# Patient Record
Sex: FEMALE | Race: BLACK OR AFRICAN AMERICAN | NOT HISPANIC OR LATINO | Employment: PART TIME | URBAN - METROPOLITAN AREA
[De-identification: names, ages, dates, MRNs, and addresses within clinical notes are randomized per-mention and may not be internally consistent; named-entity substitution may affect disease eponyms.]

---

## 2017-06-19 ENCOUNTER — OFFICE VISIT (OUTPATIENT)
Dept: OBSTETRICS AND GYNECOLOGY | Facility: CLINIC | Age: 42
End: 2017-06-19

## 2017-06-19 VITALS
SYSTOLIC BLOOD PRESSURE: 110 MMHG | HEART RATE: 70 BPM | DIASTOLIC BLOOD PRESSURE: 72 MMHG | HEIGHT: 68 IN | BODY MASS INDEX: 22.43 KG/M2 | WEIGHT: 148 LBS

## 2017-06-19 DIAGNOSIS — Z20.2 EXPOSURE TO STD: ICD-10-CM

## 2017-06-19 DIAGNOSIS — N89.8 VAGINAL DISCHARGE: Primary | ICD-10-CM

## 2017-06-19 PROCEDURE — 99213 OFFICE O/P EST LOW 20 MIN: CPT | Performed by: OBSTETRICS & GYNECOLOGY

## 2017-06-19 RX ORDER — LISINOPRIL 5 MG/1
TABLET ORAL
Refills: 6 | COMMUNITY
Start: 2017-06-06 | End: 2017-12-06

## 2017-06-19 RX ORDER — AMLODIPINE BESYLATE 5 MG/1
TABLET ORAL
Refills: 2 | COMMUNITY
Start: 2017-06-04 | End: 2021-01-06

## 2017-06-19 RX ORDER — CYCLOBENZAPRINE HCL 5 MG
TABLET ORAL
Refills: 0 | COMMUNITY
Start: 2017-05-24 | End: 2017-12-06

## 2017-06-19 NOTE — PROGRESS NOTES
"Chief Complaint   Patient presents with   • Vaginal Discharge       History of Present Illness    Patient is a 42 y.o. female complains of constant vaginal discharge for the past few months. Patient was diagnosed with trichomonas at outside facility several months ago and does not believe her  got treated. She feels like her symptoms have not improved since treatment with flagyl. Patient would like to be screened for all STDs.    The following portions of the patient's history were reviewed and updated as appropriate: allergies, current medications, past family history, past medical history, past social history, past surgical history and problem list.    Review of Systems   Genitourinary: Positive for vaginal discharge. Negative for menstrual problem.   All other systems reviewed and are negative.      Vitals:    06/19/17 1146   BP: 110/72   Pulse: 70   Weight: 148 lb (67.1 kg)   Height: 67.5\" (171.5 cm)       Objective   Physical Exam   Constitutional: She appears well-developed and well-nourished.   HENT:   Head: Normocephalic and atraumatic.   Abdominal: Soft. She exhibits no distension and no mass. There is no tenderness. There is no rebound and no guarding. No hernia.   Genitourinary: Rectum normal and uterus normal. Rectal exam shows no external hemorrhoid. There is no lesion on the right labia. There is no lesion on the left labia. Uterus is not tender. Cervix exhibits no discharge. Right adnexum displays no mass and no tenderness. Left adnexum displays no mass and no tenderness. Vaginal discharge found.   Musculoskeletal: Normal range of motion.   Lymphadenopathy:        Right: No inguinal adenopathy present.        Left: No inguinal adenopathy present.   Neurological: She is alert.   Skin: Skin is warm.   Psychiatric: She has a normal mood and affect.         Assessment/Plan   Juan C was seen today for vaginal discharge.    Diagnoses and all orders for this visit:    Vaginal discharge  -     NuSwab " VG+    Exposure to STD  -     Hepatitis B Surface Antigen  -     Hepatitis C Antibody  -     HIV-1 / O / 2 Ag / Antibody 4th Generation  -     RPR, Rfx Qn RPR / Confirm TP    will contact patient with results. Preventative measures discussed including condom use.    10/15 minutes spent in face to face patient consultation.         Return if symptoms worsen or fail to improve.

## 2017-06-20 LAB
HBV SURFACE AG SERPL QL IA: NEGATIVE
HCV AB S/CO SERPL IA: <0.1 S/CO RATIO (ref 0–0.9)
HIV 1+2 AB+HIV1 P24 AG SERPL QL IA: NON REACTIVE
RPR SER QL: NON REACTIVE

## 2017-06-22 LAB
A VAGINAE DNA VAG QL NAA+PROBE: ABNORMAL SCORE
BVAB2 DNA VAG QL NAA+PROBE: ABNORMAL SCORE
C ALBICANS DNA VAG QL NAA+PROBE: NEGATIVE
C GLABRATA DNA VAG QL NAA+PROBE: NEGATIVE
C TRACH RRNA SPEC QL NAA+PROBE: NEGATIVE
MEGA1 DNA VAG QL NAA+PROBE: ABNORMAL SCORE
N GONORRHOEA RRNA SPEC QL NAA+PROBE: NEGATIVE
T VAGINALIS RRNA SPEC QL NAA+PROBE: POSITIVE

## 2017-06-22 RX ORDER — METRONIDAZOLE 500 MG/1
TABLET ORAL
Qty: 4 TABLET | Refills: 0 | Status: SHIPPED | OUTPATIENT
Start: 2017-06-22 | End: 2017-06-27

## 2017-06-27 ENCOUNTER — TELEPHONE (OUTPATIENT)
Dept: OBSTETRICS AND GYNECOLOGY | Facility: CLINIC | Age: 42
End: 2017-06-27

## 2017-06-27 RX ORDER — METRONIDAZOLE 7.5 MG/G
GEL VAGINAL NIGHTLY
Qty: 1 TUBE | Refills: 0 | Status: SHIPPED | OUTPATIENT
Start: 2017-06-27 | End: 2017-12-06

## 2017-06-27 NOTE — TELEPHONE ENCOUNTER
----- Message from Gayle Logan sent at 6/27/2017  9:46 AM EDT -----  Contact: pt  Patient called stating she took all of the Flagyl at once. Then she vomited 2 of them in the toilet. She is asking for another RX. Patient said she is still having the vaginal irritation. Please advise. Pt # is [phone]    Pharmacy is in chart    I sent metrogel - the cream instead.

## 2017-12-06 ENCOUNTER — OFFICE VISIT (OUTPATIENT)
Dept: OBSTETRICS AND GYNECOLOGY | Facility: CLINIC | Age: 42
End: 2017-12-06

## 2017-12-06 VITALS
HEIGHT: 68 IN | WEIGHT: 154 LBS | DIASTOLIC BLOOD PRESSURE: 88 MMHG | BODY MASS INDEX: 23.34 KG/M2 | SYSTOLIC BLOOD PRESSURE: 128 MMHG

## 2017-12-06 DIAGNOSIS — N89.8 VAGINAL LESION: Primary | ICD-10-CM

## 2017-12-06 DIAGNOSIS — N89.8 VAGINAL DISCHARGE: ICD-10-CM

## 2017-12-06 PROCEDURE — 99213 OFFICE O/P EST LOW 20 MIN: CPT | Performed by: OBSTETRICS & GYNECOLOGY

## 2017-12-06 RX ORDER — VALACYCLOVIR HYDROCHLORIDE 500 MG/1
TABLET, FILM COATED ORAL
Qty: 28 TABLET | Refills: 0 | Status: SHIPPED | OUTPATIENT
Start: 2017-12-06 | End: 2021-01-06

## 2017-12-06 NOTE — PROGRESS NOTES
Subjective   Juan C Gordon is a 42 y.o. female   Chief Complaint   Patient presents with   • Herpes Zoster     patient states the medication she is currently taking for herpes is not working for her.        History of Present Illness   Reports that she has never been diagnosed with HSV on lab exam  Has had previous exposure so she was on medication when she had prodromal symptoms.    Recently, felt symptoms and then a bump came up 3-4 weeks ago and the medication is not working after starting it two weeks ago.    The bump is irritating and feels hot.    No other rashes or lesions.   No fever or chills.  No nausea/vomiting    Also had trichomonas for which she and her partner were treated but she feels like she is still having some non odorous discharge and would like to be retested  Past Medical History:   Diagnosis Date   • Herpes genitalia    • IBS (irritable bowel syndrome)    • Ovarian cyst      Past Surgical History:   Procedure Laterality Date   • DILATION AND CURETTAGE, DIAGNOSTIC / THERAPEUTIC     • LEEP     • OVARIAN CYST REMOVAL       Social History   Substance Use Topics   • Smoking status: Never Smoker   • Smokeless tobacco: Never Used   • Alcohol use No     Family History   Problem Relation Age of Onset   • Hypertension Mother    • Hypertension Father    • Brain cancer Father        Review of Systems   Constitutional: Negative for activity change, appetite change, fatigue, fever and unexpected weight change.   Gastrointestinal: Negative for abdominal pain, nausea and vomiting.   Genitourinary: Positive for genital sores and vaginal discharge. Negative for menstrual problem, vaginal bleeding and vaginal pain.   Hematological: Does not bruise/bleed easily.   Psychiatric/Behavioral: Negative for agitation.       Objective   Physical Exam   Constitutional: She is oriented to person, place, and time. She appears well-developed and well-nourished. No distress.   HENT:   Head: Normocephalic and atraumatic.    Neck: No tracheal deviation present. No thyromegaly present.   Cardiovascular: Normal rate.    Pulmonary/Chest: Effort normal. Right breast exhibits no inverted nipple, no mass, no nipple discharge, no skin change and no tenderness. Left breast exhibits no inverted nipple, no mass, no nipple discharge, no skin change and no tenderness.   Abdominal: Soft. She exhibits no distension and no mass. There is no tenderness.   Genitourinary: Uterus normal.       No labial fusion. There is no rash, lesion or injury on the right labia. There is no rash, lesion or injury on the left labia. Uterus is not deviated, not enlarged, not fixed and not tender. Cervix exhibits no motion tenderness, no discharge and no friability. Right adnexum displays no mass, no tenderness and no fullness. Left adnexum displays no mass, no tenderness and no fullness. No tenderness or bleeding in the vagina. No vaginal discharge found.   Musculoskeletal: Normal range of motion. She exhibits no edema or deformity.   Lymphadenopathy:     She has no cervical adenopathy.   Neurological: She is alert and oriented to person, place, and time.   Skin: Skin is warm and dry. No rash noted. She is not diaphoretic.   Psychiatric: She has a normal mood and affect. Her behavior is normal. Judgment and thought content normal.         Assessment/Plan   Juan C was seen today for herpes zoster.    Diagnoses and all orders for this visit:    Vaginal lesion  -     Herpes Simplex Virus (HSV) 1 & 2, ISSA - ThinPrep Vial, Cervix    Vaginal discharge  -     Chlamydia trachomatis, Neisseria gonorrhoeae, Trichomonas vaginalis, PCR - Swab, Vagina    Other orders  -     valACYclovir (VALTREX) 500 MG tablet; Take 1g PO BID x 7 days      Discussed we can increase valtrex to primary outbreak dosing to see if resolves  Infection/pain precautions reviewed.    Will call with results

## 2017-12-08 LAB
C TRACH RRNA SPEC QL NAA+PROBE: NEGATIVE
N GONORRHOEA RRNA SPEC QL NAA+PROBE: NEGATIVE
T VAGINALIS RRNA SPEC QL NAA+PROBE: NEGATIVE

## 2017-12-09 LAB
HSV1 DNA SPEC QL NAA+PROBE: NEGATIVE
HSV2 DNA SPEC QL NAA+PROBE: NEGATIVE

## 2017-12-11 ENCOUNTER — TELEPHONE (OUTPATIENT)
Dept: OBSTETRICS AND GYNECOLOGY | Facility: CLINIC | Age: 42
End: 2017-12-11

## 2017-12-11 NOTE — TELEPHONE ENCOUNTER
----- Message from Danni Grossman MD sent at 12/10/2017  6:41 PM EST -----  Please notify patient of negative HSV (herpes) and Chlamydia/gonorrhea/trichomonas testing.  If lesion is still present, recommend follow up in 2 weeks

## 2018-09-04 ENCOUNTER — OFFICE VISIT (OUTPATIENT)
Dept: OBSTETRICS AND GYNECOLOGY | Facility: CLINIC | Age: 43
End: 2018-09-04

## 2018-09-04 VITALS
HEART RATE: 87 BPM | DIASTOLIC BLOOD PRESSURE: 74 MMHG | HEIGHT: 68 IN | WEIGHT: 150 LBS | SYSTOLIC BLOOD PRESSURE: 113 MMHG | BODY MASS INDEX: 22.73 KG/M2

## 2018-09-04 DIAGNOSIS — N76.1 SUBACUTE VAGINITIS: Primary | ICD-10-CM

## 2018-09-04 PROCEDURE — 99213 OFFICE O/P EST LOW 20 MIN: CPT | Performed by: OBSTETRICS & GYNECOLOGY

## 2018-09-04 RX ORDER — CYCLOBENZAPRINE HCL 5 MG
5 TABLET ORAL
COMMUNITY
Start: 2018-03-23 | End: 2019-03-23

## 2018-09-04 RX ORDER — ERGOCALCIFEROL 1.25 MG/1
CAPSULE ORAL
Refills: 1 | COMMUNITY
Start: 2018-06-12 | End: 2021-01-06

## 2018-09-04 RX ORDER — IBUPROFEN 600 MG/1
TABLET ORAL
COMMUNITY
End: 2021-01-06

## 2018-09-04 RX ORDER — AMLODIPINE BESYLATE 5 MG/1
5 TABLET ORAL
COMMUNITY
Start: 2018-04-25 | End: 2021-01-06

## 2018-09-04 NOTE — PROGRESS NOTES
"Sawyer Gordon is a 43 y.o. female   Chief Complaint   Patient presents with   • Vaginal Discharge     patient reports clear whitish discharge. Patient also reports vaginal odor one time. Patient denies itching or burning.       History of Present Illness  Patient reports a month of vaginal discharge that has been coming and going.  Sometimes clear, sometimes light.  1 and it was thick.  Once it was associated with an unpleasant odor that was not fishy but didn't smell \"fresh.\"  Denies any fevers or chills denies new sexual partners.  Past Medical History:   Diagnosis Date   • Herpes genitalia    • IBS (irritable bowel syndrome)    • Ovarian cyst      Past Surgical History:   Procedure Laterality Date   • DILATION AND CURETTAGE, DIAGNOSTIC / THERAPEUTIC     • LEEP     • OVARIAN CYST REMOVAL       Social History   Substance Use Topics   • Smoking status: Never Smoker   • Smokeless tobacco: Never Used   • Alcohol use No     Family History   Problem Relation Age of Onset   • Hypertension Mother    • Hypertension Father    • Brain cancer Father      Review of Systems   Constitutional: Negative for activity change, appetite change, fatigue, fever and unexpected weight change.   Gastrointestinal: Negative for abdominal pain, nausea and vomiting.   Genitourinary: Positive for vaginal discharge. Negative for menstrual problem, vaginal bleeding and vaginal pain.   Hematological: Does not bruise/bleed easily.   Psychiatric/Behavioral: Negative for agitation.       Objective    /74   Pulse 87   Ht 171.5 cm (67.52\")   Wt 68 kg (150 lb)   LMP 08/11/2018 (Within Days)   BMI 23.13 kg/m²    Physical Exam   Constitutional: She is oriented to person, place, and time. She appears well-developed and well-nourished.   HENT:   Head: Normocephalic and atraumatic.   Eyes: EOM are normal. No scleral icterus.   Neck: Normal range of motion.   Cardiovascular: Normal rate.    Pulmonary/Chest: Effort normal. No respiratory " distress.   Abdominal: Soft.   Genitourinary: Uterus normal. Cervix exhibits no motion tenderness, no discharge and no friability. Right adnexum displays no mass, no tenderness and no fullness. Left adnexum displays no mass, no tenderness and no fullness. No tenderness in the vagina. Vaginal discharge (scant, thin) found.   Genitourinary Comments: Erythema of labia minora and vaginal mucosa   Neurological: She is alert and oriented to person, place, and time.   Skin: Skin is warm and dry.   Psychiatric: She has a normal mood and affect. Her behavior is normal.     Assessment/Plan   Problems Addressed this Visit     None      Visit Diagnoses     Subacute vaginitis    -  Primary    Relevant Orders    NuSwab VG+ - Swab, Vagina      Treat based on results  Reviewed avoidance of irritants, douching, scented soaps/feminine washes.    Return in about 4 months (around 12/31/2018) for Annual physical.

## 2018-09-07 ENCOUNTER — TELEPHONE (OUTPATIENT)
Dept: OBSTETRICS AND GYNECOLOGY | Facility: CLINIC | Age: 43
End: 2018-09-07

## 2018-09-07 LAB
A VAGINAE DNA VAG QL NAA+PROBE: NORMAL SCORE
BVAB2 DNA VAG QL NAA+PROBE: NORMAL SCORE
C ALBICANS DNA VAG QL NAA+PROBE: NEGATIVE
C GLABRATA DNA VAG QL NAA+PROBE: NEGATIVE
C TRACH RRNA SPEC QL NAA+PROBE: NEGATIVE
MEGA1 DNA VAG QL NAA+PROBE: NORMAL SCORE
N GONORRHOEA RRNA SPEC QL NAA+PROBE: NEGATIVE
T VAGINALIS RRNA SPEC QL NAA+PROBE: NEGATIVE

## 2018-09-07 NOTE — TELEPHONE ENCOUNTER
09/07/18  Pt informed of results.    ----- Message from Danni Grossman MD sent at 9/7/2018  1:14 PM EDT -----  Please inform patient of negative gonorrhea/chlamydia/trichomonas/BV/yeast testing

## 2019-01-14 ENCOUNTER — APPOINTMENT (OUTPATIENT)
Dept: GENERAL RADIOLOGY | Facility: HOSPITAL | Age: 44
End: 2019-01-14

## 2019-01-14 ENCOUNTER — HOSPITAL ENCOUNTER (EMERGENCY)
Facility: HOSPITAL | Age: 44
Discharge: HOME | End: 2019-01-14
Attending: EMERGENCY MEDICINE | Admitting: EMERGENCY MEDICINE

## 2019-01-14 VITALS
BODY MASS INDEX: 21.66 KG/M2 | HEIGHT: 67 IN | WEIGHT: 138 LBS | OXYGEN SATURATION: 100 % | RESPIRATION RATE: 16 BRPM | TEMPERATURE: 98.5 F | HEART RATE: 71 BPM | SYSTOLIC BLOOD PRESSURE: 150 MMHG | DIASTOLIC BLOOD PRESSURE: 100 MMHG

## 2019-01-14 DIAGNOSIS — R07.9 CHEST PAIN AT REST: Primary | ICD-10-CM

## 2019-01-14 LAB
ALBUMIN SERPL-MCNC: 4.1 G/DL (ref 3.5–5.2)
ALBUMIN/GLOB SERPL: 1.2 G/DL
ALP SERPL-CCNC: 63 U/L (ref 39–117)
ALT SERPL W P-5'-P-CCNC: 17 U/L (ref 1–33)
ANION GAP SERPL CALCULATED.3IONS-SCNC: 10.6 MMOL/L
AST SERPL-CCNC: 15 U/L (ref 1–32)
BASOPHILS # BLD AUTO: 0.03 10*3/MM3 (ref 0–0.2)
BASOPHILS NFR BLD AUTO: 0.6 % (ref 0–1.5)
BILIRUB SERPL-MCNC: 0.2 MG/DL (ref 0.1–1.2)
BUN BLD-MCNC: 11 MG/DL (ref 6–20)
BUN/CREAT SERPL: 13.8 (ref 7–25)
CALCIUM SPEC-SCNC: 8.7 MG/DL (ref 8.6–10.5)
CHLORIDE SERPL-SCNC: 103 MMOL/L (ref 98–107)
CO2 SERPL-SCNC: 25.4 MMOL/L (ref 22–29)
CREAT BLD-MCNC: 0.8 MG/DL (ref 0.57–1)
DEPRECATED RDW RBC AUTO: 42.1 FL (ref 37–54)
EOSINOPHIL # BLD AUTO: 0.04 10*3/MM3 (ref 0–0.7)
EOSINOPHIL NFR BLD AUTO: 0.9 % (ref 0.3–6.2)
ERYTHROCYTE [DISTWIDTH] IN BLOOD BY AUTOMATED COUNT: 13.3 % (ref 11.7–13)
GFR SERPL CREATININE-BSD FRML MDRD: 94 ML/MIN/1.73
GLOBULIN UR ELPH-MCNC: 3.3 GM/DL
GLUCOSE BLD-MCNC: 91 MG/DL (ref 65–99)
HCT VFR BLD AUTO: 38.3 % (ref 35.6–45.5)
HGB BLD-MCNC: 13.1 G/DL (ref 11.9–15.5)
IMM GRANULOCYTES # BLD AUTO: 0 10*3/MM3 (ref 0–0.03)
IMM GRANULOCYTES NFR BLD AUTO: 0 % (ref 0–0.5)
LYMPHOCYTES # BLD AUTO: 1.41 10*3/MM3 (ref 0.9–4.8)
LYMPHOCYTES NFR BLD AUTO: 30.4 % (ref 19.6–45.3)
MCH RBC QN AUTO: 29.4 PG (ref 26.9–32)
MCHC RBC AUTO-ENTMCNC: 34.2 G/DL (ref 32.4–36.3)
MCV RBC AUTO: 85.9 FL (ref 80.5–98.2)
MONOCYTES # BLD AUTO: 0.17 10*3/MM3 (ref 0.2–1.2)
MONOCYTES NFR BLD AUTO: 3.7 % (ref 5–12)
NEUTROPHILS # BLD AUTO: 2.99 10*3/MM3 (ref 1.9–8.1)
NEUTROPHILS NFR BLD AUTO: 64.4 % (ref 42.7–76)
PLATELET # BLD AUTO: 256 10*3/MM3 (ref 140–500)
PMV BLD AUTO: 9.7 FL (ref 6–12)
POTASSIUM BLD-SCNC: 3.6 MMOL/L (ref 3.5–5.2)
PROT SERPL-MCNC: 7.4 G/DL (ref 6–8.5)
RBC # BLD AUTO: 4.46 10*6/MM3 (ref 3.9–5.2)
SODIUM BLD-SCNC: 139 MMOL/L (ref 136–145)
TROPONIN T SERPL-MCNC: <0.01 NG/ML (ref 0–0.03)
TROPONIN T SERPL-MCNC: <0.01 NG/ML (ref 0–0.03)
WBC NRBC COR # BLD: 4.64 10*3/MM3 (ref 4.5–10.7)

## 2019-01-14 PROCEDURE — 80053 COMPREHEN METABOLIC PANEL: CPT | Performed by: EMERGENCY MEDICINE

## 2019-01-14 PROCEDURE — 96374 THER/PROPH/DIAG INJ IV PUSH: CPT

## 2019-01-14 PROCEDURE — 84484 ASSAY OF TROPONIN QUANT: CPT | Performed by: EMERGENCY MEDICINE

## 2019-01-14 PROCEDURE — 71045 X-RAY EXAM CHEST 1 VIEW: CPT

## 2019-01-14 PROCEDURE — 99283 EMERGENCY DEPT VISIT LOW MDM: CPT

## 2019-01-14 PROCEDURE — 93010 ELECTROCARDIOGRAM REPORT: CPT | Performed by: INTERNAL MEDICINE

## 2019-01-14 PROCEDURE — 93005 ELECTROCARDIOGRAM TRACING: CPT | Performed by: EMERGENCY MEDICINE

## 2019-01-14 PROCEDURE — 93005 ELECTROCARDIOGRAM TRACING: CPT

## 2019-01-14 PROCEDURE — 25010000002 KETOROLAC TROMETHAMINE PER 15 MG: Performed by: EMERGENCY MEDICINE

## 2019-01-14 PROCEDURE — 85025 COMPLETE CBC W/AUTO DIFF WBC: CPT | Performed by: EMERGENCY MEDICINE

## 2019-01-14 RX ORDER — SODIUM CHLORIDE 0.9 % (FLUSH) 0.9 %
10 SYRINGE (ML) INJECTION AS NEEDED
Status: DISCONTINUED | OUTPATIENT
Start: 2019-01-14 | End: 2019-01-15 | Stop reason: HOSPADM

## 2019-01-14 RX ORDER — KETOROLAC TROMETHAMINE 30 MG/ML
30 INJECTION, SOLUTION INTRAMUSCULAR; INTRAVENOUS ONCE
Status: COMPLETED | OUTPATIENT
Start: 2019-01-14 | End: 2019-01-14

## 2019-01-14 RX ADMIN — KETOROLAC TROMETHAMINE 30 MG: 30 INJECTION, SOLUTION INTRAMUSCULAR at 21:31

## 2019-01-15 NOTE — ED PROVIDER NOTES
EMERGENCY DEPARTMENT ENCOUNTER    CHIEF COMPLAINT  Chief Complaint: Chest Pain  History given by: Pt  History limited by: none  Room Number: 15/15  PMD: Forrest Giron MD      HPI:  Pt is a 43 y.o. female who presents complaining of mid-sternal chest pain that began 6 hours ago. Pt states pain is worse with movement. Pt states the pain began while walking to kitchen from her office. Pt states she then went to bathroom and, as she was getting up, her pain worsened and she began to feel nauseated. Pt also c/o neck pain (states she has cysts on her thyroid). Pt denies vomiting.    Duration:  6 hours ago  Onset: gradual  Timing: constant  Location: chest  Radiation: none  Quality: pain  Intensity/Severity: moderate  Progression: unchanged  Associated Symptoms: neck pain, nausea   Aggravating Factors: movement  Alleviating Factors: rest  Previous Episodes: none  Treatment before arrival: none    PAST MEDICAL HISTORY  Active Ambulatory Problems     Diagnosis Date Noted   • Encounter for gynecological examination with abnormal finding 12/12/2016   • Vaginal discharge 06/19/2017   • Exposure to STD 06/19/2017     Resolved Ambulatory Problems     Diagnosis Date Noted   • No Resolved Ambulatory Problems     Past Medical History:   Diagnosis Date   • Herpes genitalia    • IBS (irritable bowel syndrome)    • Ovarian cyst        PAST SURGICAL HISTORY  Past Surgical History:   Procedure Laterality Date   • DILATION AND CURETTAGE, DIAGNOSTIC / THERAPEUTIC     • LEEP     • OVARIAN CYST REMOVAL         FAMILY HISTORY  Family History   Problem Relation Age of Onset   • Hypertension Mother    • Hypertension Father    • Brain cancer Father        SOCIAL HISTORY  Social History     Socioeconomic History   • Marital status: Legally      Spouse name: Not on file   • Number of children: Not on file   • Years of education: Not on file   • Highest education level: Not on file   Social Needs   • Financial resource strain: Not on file    • Food insecurity - worry: Not on file   • Food insecurity - inability: Not on file   • Transportation needs - medical: Not on file   • Transportation needs - non-medical: Not on file   Occupational History   • Not on file   Tobacco Use   • Smoking status: Never Smoker   • Smokeless tobacco: Never Used   Substance and Sexual Activity   • Alcohol use: No   • Drug use: No   • Sexual activity: Yes     Partners: Male     Birth control/protection: Other     Comment: vasectomy   Other Topics Concern   • Not on file   Social History Narrative   • Not on file       ALLERGIES  Patient has no known allergies.    REVIEW OF SYSTEMS  Review of Systems   Constitutional: Negative for fever.   HENT: Negative for sore throat.    Eyes: Negative.    Respiratory: Negative for cough and shortness of breath.    Cardiovascular: Positive for chest pain (began 6 hours PTA).   Gastrointestinal: Positive for nausea. Negative for abdominal pain, diarrhea and vomiting.   Genitourinary: Negative for dysuria.   Musculoskeletal: Positive for neck pain.   Skin: Negative for rash.   Allergic/Immunologic: Negative.    Neurological: Negative for weakness, numbness and headaches.   Hematological: Negative.    Psychiatric/Behavioral: Negative.    All other systems reviewed and are negative.      PHYSICAL EXAM  ED Triage Vitals   Temp Heart Rate Resp BP SpO2   01/14/19 1934 01/14/19 1934 01/14/19 1934 01/14/19 2015 01/14/19 1934   98.5 °F (36.9 °C) 80 16 158/98 99 %      Temp src Heart Rate Source Patient Position BP Location FiO2 (%)   01/14/19 1934 01/14/19 1934 -- -- --   Tympanic Monitor          Physical Exam   Constitutional: She is oriented to person, place, and time. No distress.   HENT:   Head: Normocephalic and atraumatic.   Eyes: EOM are normal. Pupils are equal, round, and reactive to light.   Neck: Normal range of motion. Neck supple.   Cardiovascular: Normal rate, regular rhythm and normal heart sounds.   Pulmonary/Chest: Effort normal  and breath sounds normal. No respiratory distress.   Pain reproducible with movement   Abdominal: Soft. There is no tenderness. There is no rebound and no guarding.   Musculoskeletal: Normal range of motion. She exhibits no edema.   Neurological: She is alert and oriented to person, place, and time. She has normal sensation and normal strength.   Skin: Skin is warm and dry. No rash noted.   Psychiatric: Mood and affect normal.   Nursing note and vitals reviewed.      LAB RESULTS  Lab Results (last 24 hours)     Procedure Component Value Units Date/Time    CBC & Differential [[id number]] Collected:  01/14/19 2015    Specimen:  Blood Updated:  01/14/19 2030    Narrative:       The following orders were created for panel order CBC & Differential.  Procedure                               Abnormality         Status                     ---------                               -----------         ------                     CBC Auto Differential[[id number]]        Abnormal            Final result                 Please view results for these tests on the individual orders.    Comprehensive Metabolic Panel [[id number]] Collected:  01/14/19 2015    Specimen:  Blood Updated:  01/14/19 2050     Glucose 91 mg/dL      BUN 11 mg/dL      Creatinine 0.80 mg/dL      Sodium 139 mmol/L      Potassium 3.6 mmol/L      Chloride 103 mmol/L      CO2 25.4 mmol/L      Calcium 8.7 mg/dL      Total Protein 7.4 g/dL      Albumin 4.10 g/dL      ALT (SGPT) 17 U/L      AST (SGOT) 15 U/L      Alkaline Phosphatase 63 U/L      Total Bilirubin 0.2 mg/dL      eGFR  African Amer 94 mL/min/1.73      Globulin 3.3 gm/dL      A/G Ratio 1.2 g/dL      BUN/Creatinine Ratio 13.8     Anion Gap 10.6 mmol/L     Troponin [[id number]]  (Normal) Collected:  01/14/19 2015    Specimen:  Blood Updated:  01/14/19 2050     Troponin T <0.010 ng/mL     Narrative:       Troponin T Reference Ranges:  Less than 0.03 ng/mL:    Negative for AMI  0.03 to 0.09 ng/mL:      Indeterminant  for AMI  Greater than 0.09 ng/mL: Positive for AMI    CBC Auto Differential [[id number]]  (Abnormal) Collected:  01/14/19 2015    Specimen:  Blood Updated:  01/14/19 2030     WBC 4.64 10*3/mm3      RBC 4.46 10*6/mm3      Hemoglobin 13.1 g/dL      Hematocrit 38.3 %      MCV 85.9 fL      MCH 29.4 pg      MCHC 34.2 g/dL      RDW 13.3 %      RDW-SD 42.1 fl      MPV 9.7 fL      Platelets 256 10*3/mm3      Neutrophil % 64.4 %      Lymphocyte % 30.4 %      Monocyte % 3.7 %      Eosinophil % 0.9 %      Basophil % 0.6 %      Immature Grans % 0.0 %      Neutrophils, Absolute 2.99 10*3/mm3      Lymphocytes, Absolute 1.41 10*3/mm3      Monocytes, Absolute 0.17 10*3/mm3      Eosinophils, Absolute 0.04 10*3/mm3      Basophils, Absolute 0.03 10*3/mm3      Immature Grans, Absolute 0.00 10*3/mm3     Troponin [[id number]]  (Normal) Collected:  01/14/19 2133    Specimen:  Blood Updated:  01/14/19 2206     Troponin T <0.010 ng/mL     Narrative:       Troponin T Reference Ranges:  Less than 0.03 ng/mL:    Negative for AMI  0.03 to 0.09 ng/mL:      Indeterminant for AMI  Greater than 0.09 ng/mL: Positive for AMI          I ordered the above labs and reviewed the results    RADIOLOGY  XR Chest 1 View   Final Result   No evidence for acute pulmonary process. Follow-up as   clinical indications persist.       This report was finalized on 1/14/2019 8:28 PM by Dr. Luc Childs M.D.               I ordered the above noted radiological studies. Interpreted by radiologist. Reviewed by me in PACS.       PROCEDURES  Procedures      PROGRESS AND CONSULTS       2040  Initial encounter. Notified pt of unremarkable lab work, including negative troponin. Discussed the plan to empirically treat pain and further evaluate with second troponin. Pt is agreeable.     2059  Ordered second troponin.    2208  Rechecked pt. Pt is resting comfortably. Notified pt of negative second troponin. Discussed the plan to discharge the pt home. I instructed the pt  to f/u with PCP. Pt understands and agrees with the plan, all questions answered.      MEDICAL DECISION MAKING  Results were reviewed/discussed with the patient and they were also made aware of online access. Pt also made aware that some labs, such as cultures, will not be resulted during ER visit and follow up with PMD is necessary.     MDM  Number of Diagnoses or Management Options  Chest pain at rest:      Amount and/or Complexity of Data Reviewed  Clinical lab tests: ordered and reviewed (Lab work is unremarkable.)  Tests in the radiology section of CPT®: ordered and reviewed (CXR is negative. )           DIAGNOSIS  Final diagnoses:   Chest pain at rest       DISPOSITION  DISCHARGE    Patient discharged in stable condition.    Reviewed implications of results, diagnosis, meds, responsibility to follow up, warning signs and symptoms of possible worsening, potential complications and reasons to return to ER.    Patient/Family voiced understanding of above instructions.    Discussed plan for discharge, as there is no emergent indication for admission. Patient referred to primary care provider for BP management due to today's BP. Pt/family is agreeable and understands need for follow up and repeat testing.  Pt is aware that discharge does not mean that nothing is wrong but it indicates no emergency is present that requires admission and they must continue care with follow-up as given below or physician of their choice.     FOLLOW-UP  Forrest Giron MD  [address] [facility] [address] [phone]    Call            Medication List      No changes were made to your prescriptions during this visit.           Latest Documented Vital Signs:  As of 10:29 PM  BP- 150/100 HR- 71 Temp- 98.5 °F (36.9 °C) (Oral) O2 sat- 100%    --  Documentation assistance provided by lisset Veliz for Dr. Leon.  Information recorded by the lisset was done at my direction and has been verified and validated by me.           Deshawn Veliz  01/14/19 5077       Demetrius Leon MD  01/15/19 8895

## 2019-01-15 NOTE — ED NOTES
Reassurance given; call light in reach. Pts breathing even and unlabored. Pt appears in NAD at this time. Family at bedside. Blankets provided for warmth.      Jessi Quiroga RN  01/14/19 2020

## 2021-01-06 ENCOUNTER — OFFICE VISIT (OUTPATIENT)
Dept: OBSTETRICS AND GYNECOLOGY | Facility: CLINIC | Age: 46
End: 2021-01-06

## 2021-01-06 VITALS
DIASTOLIC BLOOD PRESSURE: 83 MMHG | HEIGHT: 67 IN | WEIGHT: 143 LBS | BODY MASS INDEX: 22.44 KG/M2 | SYSTOLIC BLOOD PRESSURE: 126 MMHG

## 2021-01-06 DIAGNOSIS — Z11.3 SCREENING FOR STD (SEXUALLY TRANSMITTED DISEASE): ICD-10-CM

## 2021-01-06 DIAGNOSIS — Z30.013 ENCOUNTER FOR INITIAL PRESCRIPTION OF INJECTABLE CONTRACEPTIVE: ICD-10-CM

## 2021-01-06 DIAGNOSIS — N89.8 VAGINAL ITCHING: ICD-10-CM

## 2021-01-06 DIAGNOSIS — Z01.419 WOMEN'S ANNUAL ROUTINE GYNECOLOGICAL EXAMINATION: Primary | ICD-10-CM

## 2021-01-06 LAB
B-HCG UR QL: NEGATIVE
INTERNAL NEGATIVE CONTROL: NEGATIVE
INTERNAL POSITIVE CONTROL: POSITIVE
Lab: NORMAL

## 2021-01-06 PROCEDURE — 81025 URINE PREGNANCY TEST: CPT | Performed by: NURSE PRACTITIONER

## 2021-01-06 PROCEDURE — 99396 PREV VISIT EST AGE 40-64: CPT | Performed by: NURSE PRACTITIONER

## 2021-01-06 RX ORDER — MEDROXYPROGESTERONE ACETATE 150 MG/ML
150 INJECTION, SUSPENSION INTRAMUSCULAR
Qty: 1 EACH | Refills: 4 | Status: SHIPPED | OUTPATIENT
Start: 2021-01-06 | End: 2021-01-11

## 2021-01-06 RX ORDER — FLUCONAZOLE 150 MG/1
150 TABLET ORAL DAILY
Qty: 1 TABLET | Refills: 3 | Status: SHIPPED | OUTPATIENT
Start: 2021-01-06 | End: 2021-03-29

## 2021-01-06 NOTE — PROGRESS NOTES
GYN Annual Exam     Chief Complaint   Patient presents with   • Gynecologic Exam     Annual exam - last pap 2016 negative, MG 2015.       HPI    Juan C Gordon is a 45 y.o. female who presents for annual well woman exam.  She is sexually active. Currently not using contraception including condoms.  Periods are regular every 28-30 days, lasting 5 days. Dysmenorrhea:none. Cyclic symptoms include none. No intermenstrual bleeding, spotting, or discharge. Performing SBE:occas.  Hx of HSV, last outbreak 2020, taking valtrex episodically. She states she does not need refill of valtrex at this time. She took Plan B in 2020.   Hx of HTN, no current medications She is interested contraceptive. She denies hx of migraines with aura, denies DVT, she is a nonsmoker.    C/o recent yeast infection, tried OTC monistat with mild improvement in symptoms, however she continues to have mild itching and irritation    Requests STD testing due to new sexual partner. No know exposure    This is my first time meeting Juan C Gordon  She is a pt of Dr Grossman's, she was last seen in our office in 2018    OB History        4    Para   4    Term   3       1    AB        Living   4       SAB        TAB        Ectopic        Molar        Multiple        Live Births                    LMP-2020  Last Pap-2016-negative, HPV negative  History of abnormal Pap smear: yes - several years ago, prior LEEP  History of STD-HSV  Family history of uterine, colon or ovarian cancer: no  Family history of breast cancer: no  Mammogram-  History of abnormal mammogram: no      Past Medical History:   Diagnosis Date   • Herpes genitalia    • IBS (irritable bowel syndrome)    • Kidney stones    • Ovarian cyst        Past Surgical History:   Procedure Laterality Date   • DILATION AND CURETTAGE, DIAGNOSTIC / THERAPEUTIC     • LEEP     • OVARIAN CYST REMOVAL         No current outpatient medications on file.    No Known Allergies    Social  "History     Tobacco Use   • Smoking status: Never Smoker   • Smokeless tobacco: Never Used   Substance Use Topics   • Alcohol use: No   • Drug use: No       Family History   Problem Relation Age of Onset   • Hypertension Mother    • Hypertension Father    • Brain cancer Father        Review of Systems   Constitutional: Negative for chills, fatigue and fever.   Gastrointestinal: Negative for abdominal distention, abdominal pain, nausea and vomiting.   Genitourinary: Negative for breast discharge, breast lump, breast pain, dyspareunia, dysuria, frequency, genital sores, menstrual problem, pelvic pain, pelvic pressure, vaginal bleeding and vaginal discharge.        + vaginal itching   Musculoskeletal: Negative for gait problem.   Skin: Negative for rash.   Neurological: Negative for dizziness and headache.   Hematological: Does not bruise/bleed easily.   Psychiatric/Behavioral: Negative for behavioral problems.       /83   Ht 170.2 cm (67\")   Wt 64.9 kg (143 lb)   LMP 12/14/2020   BMI 22.40 kg/m²     Physical Exam  Vitals signs and nursing note reviewed.   Constitutional:       Appearance: Normal appearance.   HENT:      Head: Normocephalic and atraumatic.   Eyes:      Pupils: Pupils are equal, round, and reactive to light.   Neck:      Musculoskeletal: Normal range of motion and neck supple. No muscular tenderness.   Cardiovascular:      Rate and Rhythm: Normal rate.   Pulmonary:      Effort: Pulmonary effort is normal.   Chest:      Breasts: Breasts are symmetrical.         Right: No swelling, bleeding, inverted nipple, mass, nipple discharge, skin change or tenderness.         Left: No swelling, bleeding, inverted nipple, mass, nipple discharge, skin change or tenderness.   Abdominal:      General: Abdomen is flat. There is no distension.      Palpations: Abdomen is soft. There is no mass.      Tenderness: There is no abdominal tenderness. There is no guarding.      Hernia: No hernia is present. There is " no hernia in the left inguinal area or right inguinal area.   Genitourinary:     General: Normal vulva.      Exam position: Lithotomy position.      Pubic Area: No rash or pubic lice.       Labia:         Right: No rash, tenderness, lesion or injury.         Left: No rash, tenderness, lesion or injury.       Urethra: No prolapse, urethral pain, urethral swelling or urethral lesion.      Vagina: No signs of injury and foreign body. Vaginal discharge (thick white clumpy, adhered to vaginal walls) present. No erythema, tenderness, bleeding, lesions or prolapsed vaginal walls.      Cervix: No cervical motion tenderness, discharge, friability, lesion, erythema, cervical bleeding or eversion.      Uterus: Not deviated, not enlarged, not fixed, not tender and no uterine prolapse.       Adnexa:         Right: No mass, tenderness or fullness.          Left: No mass, tenderness or fullness.     Musculoskeletal: Normal range of motion.   Lymphadenopathy:      Cervical: No cervical adenopathy.      Upper Body:      Right upper body: No supraclavicular, axillary or pectoral adenopathy.      Left upper body: No supraclavicular, axillary or pectoral adenopathy.      Lower Body: No right inguinal adenopathy. No left inguinal adenopathy.   Skin:     General: Skin is warm and dry.   Neurological:      General: No focal deficit present.      Mental Status: She is alert and oriented to person, place, and time.      Cranial Nerves: No cranial nerve deficit.   Psychiatric:         Mood and Affect: Mood normal.         Behavior: Behavior normal.         Thought Content: Thought content normal.         Judgment: Judgment normal.         Assessment     1. Annual Gyn Exam  2. Contraception management  3. Screening for STD  4. Vaginal itching    Plan   1. Well woman exam: Pap collected Yes. Recommend MVI daily.    2. Contraception: Discussed contraception options at length including pills, patch, vaginal ring, POPs,  injection, implant, and  IUDs.  The risks and benefits of the methods were discussed including but not limited to the increased risk of heart attack, blood clot, and stroke.  It was discussed the contraception does not protect against sexually transmitted infections and condoms are encouraged. Given hx of HTN and no current medications, recommend progesterone only forms. The patient desires to start Depo provera.   3. STD: Enc condoms. Desires STD screen today- Yes. Serum and NuSwab  4. Smoking status: nonsmoker  5. Patient's Body mass index is 22.4 kg/m². BMI is within normal parameters. No follow-up required..  6.    Encouraged annual mammogram screening starting at age 40. Instructed on how to perform SBE. Encouraged breast health self awareness. Encouraged to schedule mammogram screening today  7.    Encouraged 150 minutes of exercise per week if not medially contraindicated.   8.    Diflucan sent to pharmacy for vaginal itching. NuSwab collected  9.   Urine hcg negative in office    Follow Up one year or PRN    Mia Gill, APRN  1/6/2021  11:58 EST

## 2021-01-07 LAB
HAV IGM SERPL QL IA: NEGATIVE
HBV CORE IGM SERPL QL IA: NEGATIVE
HBV SURFACE AG SERPL QL IA: NEGATIVE
HCV AB S/CO SERPL IA: 0.1 S/CO RATIO (ref 0–0.9)
HIV 1+2 AB+HIV1 P24 AG SERPL QL IA: NON REACTIVE
RPR SER QL: NORMAL

## 2021-01-08 LAB
CYTOLOGIST CVX/VAG CYTO: NORMAL
CYTOLOGY CVX/VAG DOC CYTO: NORMAL
CYTOLOGY CVX/VAG DOC THIN PREP: NORMAL
DX ICD CODE: NORMAL
HIV 1 & 2 AB SER-IMP: NORMAL
HPV I/H RISK 4 DNA CVX QL PROBE+SIG AMP: NEGATIVE
OTHER STN SPEC: NORMAL
STAT OF ADQ CVX/VAG CYTO-IMP: NORMAL

## 2021-01-10 LAB
A VAGINAE DNA VAG QL NAA+PROBE: NORMAL SCORE
BVAB2 DNA VAG QL NAA+PROBE: NORMAL SCORE
C ALBICANS DNA VAG QL NAA+PROBE: NEGATIVE
C GLABRATA DNA VAG QL NAA+PROBE: NEGATIVE
C TRACH DNA VAG QL NAA+PROBE: NEGATIVE
MEGA1 DNA VAG QL NAA+PROBE: NORMAL SCORE
N GONORRHOEA DNA VAG QL NAA+PROBE: NEGATIVE
T VAGINALIS DNA VAG QL NAA+PROBE: NEGATIVE

## 2021-01-11 ENCOUNTER — TELEPHONE (OUTPATIENT)
Dept: OBSTETRICS AND GYNECOLOGY | Facility: CLINIC | Age: 46
End: 2021-01-11

## 2021-01-11 RX ORDER — ACETAMINOPHEN AND CODEINE PHOSPHATE 120; 12 MG/5ML; MG/5ML
1 SOLUTION ORAL DAILY
Qty: 28 TABLET | Refills: 12 | Status: SHIPPED | OUTPATIENT
Start: 2021-01-11 | End: 2021-03-29

## 2021-01-11 NOTE — TELEPHONE ENCOUNTER
Pt states she would rather do the progesterone only pill instead of depo provera. Pt call in for 90 day supply. Rx in chart is correct.[email or web address]

## 2021-03-05 ENCOUNTER — OFFICE VISIT (OUTPATIENT)
Dept: OBSTETRICS AND GYNECOLOGY | Facility: CLINIC | Age: 46
End: 2021-03-05

## 2021-03-05 VITALS
SYSTOLIC BLOOD PRESSURE: 125 MMHG | BODY MASS INDEX: 22.44 KG/M2 | DIASTOLIC BLOOD PRESSURE: 81 MMHG | HEIGHT: 67 IN | WEIGHT: 143 LBS

## 2021-03-05 DIAGNOSIS — N89.8 VAGINAL IRRITATION: Primary | ICD-10-CM

## 2021-03-05 PROCEDURE — 99213 OFFICE O/P EST LOW 20 MIN: CPT | Performed by: NURSE PRACTITIONER

## 2021-03-05 RX ORDER — VALACYCLOVIR HYDROCHLORIDE 500 MG/1
500 TABLET, FILM COATED ORAL DAILY
COMMUNITY
Start: 2021-02-06 | End: 2021-03-29

## 2021-03-05 RX ORDER — AMLODIPINE BESYLATE 5 MG/1
TABLET ORAL
COMMUNITY
Start: 2021-01-11 | End: 2021-03-29

## 2021-03-05 NOTE — PROGRESS NOTES
Chief Complaint   Patient presents with   • Vaginitis     Pt c/o vaginal irritation. Denies discharge or itching.         SUBJECTIVE:     Juan C Gordon is a 46 y.o.  who presents with c/o vaginal irritation for one week. Describes as burning sensation and mild itching both internally and on external genitialia Denies vaginal discharge, itching, or odor.  This is  a new problem. LMP 21. She is not currently sexually active, but reports one new partner 2 months ago. Stopped taking OCP and has not had menses since d/c'ing. Hx of HSV, taking valtrex currently, because she was concerned she could have an outbreak.     She is not douching. She is changing soaps and detergents frequently.      She is a pt of Dr Grossman's    Past Medical History:   Diagnosis Date   • Herpes genitalia    • IBS (irritable bowel syndrome)    • Kidney stones    • Ovarian cyst       Past Surgical History:   Procedure Laterality Date   • DILATION AND CURETTAGE, DIAGNOSTIC / THERAPEUTIC     • LEEP     • OVARIAN CYST REMOVAL        Social History     Tobacco Use   • Smoking status: Never Smoker   • Smokeless tobacco: Never Used   Substance Use Topics   • Alcohol use: No   • Drug use: No     OB History    Para Term  AB Living   4 4 3 1   4   SAB TAB Ectopic Molar Multiple Live Births                    # Outcome Date GA Lbr Shin/2nd Weight Sex Delivery Anes PTL Lv   4             3 Term            2 Term            1 Term                 Review of Systems   Constitutional: Negative for chills, fatigue and fever.   Gastrointestinal: Negative for abdominal distention, abdominal pain, nausea and vomiting.   Genitourinary: Positive for vaginal pain. Negative for dyspareunia, dysuria, menstrual problem, pelvic pain, vaginal bleeding and vaginal discharge.        - vaginal itching  - vaginal odor   Musculoskeletal: Negative for gait problem.   Skin: Negative for rash.   Neurological: Negative for dizziness and headaches.  "  Hematological: Does not bruise/bleed easily.   Psychiatric/Behavioral: Negative for behavioral problems.       OBJECTIVE:   Vitals:    03/05/21 1347   BP: 125/81   Weight: 64.9 kg (143 lb)   Height: 170.2 cm (67\")        Physical Exam  Vitals signs and nursing note reviewed. Exam conducted with a chaperone present.   Constitutional:       Appearance: Normal appearance.   HENT:      Head: Normocephalic and atraumatic.   Eyes:      Pupils: Pupils are equal, round, and reactive to light.   Neck:      Musculoskeletal: Normal range of motion.   Cardiovascular:      Rate and Rhythm: Normal rate.   Pulmonary:      Effort: Pulmonary effort is normal.   Abdominal:      General: Abdomen is flat. There is no distension.      Palpations: Abdomen is soft. There is no mass.      Tenderness: There is no abdominal tenderness. There is no guarding.      Hernia: No hernia is present. There is no hernia in the left inguinal area or right inguinal area.   Genitourinary:     General: Normal vulva.      Exam position: Lithotomy position.      Pubic Area: No rash or pubic lice.       Labia:         Right: No rash, tenderness, lesion or injury.         Left: No rash, tenderness, lesion or injury.       Urethra: No prolapse, urethral pain, urethral swelling or urethral lesion.      Vagina: No signs of injury and foreign body. No vaginal discharge, erythema, tenderness, bleeding, lesions or prolapsed vaginal walls.      Cervix: No cervical motion tenderness, discharge, friability, lesion, erythema, cervical bleeding or eversion.      Uterus: Not deviated, not enlarged, not fixed, not tender and no uterine prolapse.       Adnexa:         Right: No mass, tenderness or fullness.          Left: No mass, tenderness or fullness.     Musculoskeletal: Normal range of motion.   Lymphadenopathy:      Lower Body: No right inguinal adenopathy. No left inguinal adenopathy.   Skin:     General: Skin is warm and dry.   Neurological:      General: No " focal deficit present.      Mental Status: She is alert and oriented to person, place, and time.      Cranial Nerves: No cranial nerve deficit.   Psychiatric:         Mood and Affect: Mood normal.         Behavior: Behavior normal.         Thought Content: Thought content normal.         Judgment: Judgment normal.         ASSESSMENT:   1) Vaginal irritation    PLAN:   NuSwab+  Normal exam  Reassured pt, encouraged mild soaps, avoidance of douching  Encouraged OTC hydrocortisone cream PRN, sitz baths PRN  Terazol cream sent to pharmacy, advised pt not to p/u unless symptoms worsen over the weekend  Encouraged condoms with intercourse  Encouraged to take home pregnancy test if no menses in next 1 week. Offered in office Hcg, she declines    Follow up:PRN and annually      Mia Gill, APRN  3/5/2021  13:54 EST

## 2021-03-29 ENCOUNTER — OFFICE VISIT (OUTPATIENT)
Dept: ORTHOPEDIC SURGERY | Facility: CLINIC | Age: 46
End: 2021-03-29

## 2021-03-29 VITALS — WEIGHT: 143.08 LBS | TEMPERATURE: 96.9 F | BODY MASS INDEX: 22.46 KG/M2 | HEIGHT: 67 IN

## 2021-03-29 DIAGNOSIS — M17.12 PRIMARY OSTEOARTHRITIS OF LEFT KNEE: Primary | ICD-10-CM

## 2021-03-29 PROCEDURE — 73564 X-RAY EXAM KNEE 4 OR MORE: CPT | Performed by: ORTHOPAEDIC SURGERY

## 2021-03-29 PROCEDURE — 99203 OFFICE O/P NEW LOW 30 MIN: CPT | Performed by: ORTHOPAEDIC SURGERY

## 2021-03-29 NOTE — PROGRESS NOTES
General Exam    Patient: Juan C Gordon    YOB: 1975    Medical Record Number: [id number]    Chief Complaints: Left knee pain    History of Present Illness:     46 y.o. female patient who presents for evaluation treatment left knee pain.  Patient states she fell about 5 months ago on her left knee she was initially seen x-rays were taken negative for any fractures.  Took a few days off and then got back to work doing okay.  Since then she feels like her knee gives out every once a while and then has some generalized knee pain.  Also notes a bump below her kneecap.  Currently says it does not swell and the pain is all the time that the bump on her knee is not tender to palpation.  Her symptoms seem to be worse with increased activity but better with rest.  No recent trauma.    Denies any numbness or tingling.  Denies any fevers, cough or shortness of breath.    Allergies: No Known Allergies    Home Medications:    No current outpatient medications on file.    Past Medical History:   Diagnosis Date   • Herpes genitalia    • IBS (irritable bowel syndrome)    • Kidney stones    • Ovarian cyst        Past Surgical History:   Procedure Laterality Date   • DILATION AND CURETTAGE, DIAGNOSTIC / THERAPEUTIC     • LEEP     • OVARIAN CYST REMOVAL         Social History     Occupational History   • Not on file   Tobacco Use   • Smoking status: Never Smoker   • Smokeless tobacco: Never Used   Substance and Sexual Activity   • Alcohol use: No   • Drug use: No   • Sexual activity: Yes     Partners: Male     Birth control/protection: Other     Comment: vasectomy      Social History     Social History Narrative   • Not on file       Family History   Problem Relation Age of Onset   • Hypertension Mother    • Hypertension Father    • Brain cancer Father        Review of Systems:      Constitutional: Denies fever, shaking or chills   Eyes: Denies change in visual acuity   HEENT: Denies nasal congestion or sore throat  "  Respiratory: Denies cough or shortness of breath   Cardiovascular: Denies chest pain or edema  Endocrine: Denies tremors, palpitations, intolerance of heat or cold, polyuria, polydipsia.  GI: Denies abdominal pain, nausea, vomiting, bloody stools or diarrhea  : Denies frequency, urgency, incontinence, retention, or nocturia.  Musculoskeletal: Denies numbness, tingling or loss of motor function except as above  Integument: Denies rash, lesion or ulceration   Neurologic: Denies headache or focal weakness, deficits  Heme: Denies spontaneous or excessive bleeding, epistaxis, hematuria, melena, fatigue, enlarged or tender lymph nodes.      All other pertinent positives and negatives as noted above in HPI.    Physical Exam: 46 y.o. female    Vitals:    03/29/21 1006   Temp: 96.9 °F (36.1 °C)   Weight: 64.9 kg (143 lb 1.3 oz)   Height: 170.2 cm (67.01\")       General:  Patient is awake and alert.  Appears in no acute distress or discomfort.    Psych:  Affect and demeanor are appropriate.    Eyes:  Conjunctiva and sclera appear grossly normal.  Eyes track well and EOM seem to be intact.    Ears:  No gross abnormalities.  Hearing adequate for the exam.    Cardiovascular:  Regular rate and rhythm.    Lungs:  Good chest expansion.  Breathing unlabored.    Lymph:  No palpable masses or adenopathy in the affected extremity    Musculoskeletal/Extremities:    Left lower extremity: No tenderness palpation about the left knee.  Knee range of motion is full and painless.  No joint line tenderness.  There is a slight bony prominence of the tibial tubercle but is nonpainful.  Knee stable to varus valgus stress negative Azucena's.  No knee effusion.         Radiology:       AP pelvis and 4 views of the left knee were taken and reviewed to evaluate the patient's complaint/s.    AP pelvis demonstrates mild degenerative changes bilateral hip joints no acute fractures appreciated.    4 views of the knee demonstrates mild degenerative " changes mainly in the medial compartment.  No acute fractures or other lesions appreciated.     No imaging for comparison.    Assessment: Left knee osteoarthritis      Plan:      Discussed clinical and imaging findings with the patient.  Feel that the patient may have some early underlying arthritis that gets stirred up from time to time with increased activity think she also has some weakness in the left leg versus the right I recommend this time anti-inflammatories for symptom management and some formal physical therapy to work on strengthening of the left lower extremity.           We will plan for follow up as needed.    All questions were answered.  Patient understands and agrees with the plan.    Toñito Cota MD    03/29/2021    CC to Forrest Giron MD

## 2022-07-11 ENCOUNTER — TELEPHONE (OUTPATIENT)
Dept: OBSTETRICS AND GYNECOLOGY | Facility: CLINIC | Age: 47
End: 2022-07-11

## 2022-07-11 RX ORDER — VALACYCLOVIR HYDROCHLORIDE 500 MG/1
TABLET, FILM COATED ORAL
Qty: 30 TABLET | Refills: 1 | Status: SHIPPED | OUTPATIENT
Start: 2022-07-11

## 2022-07-11 NOTE — TELEPHONE ENCOUNTER
Pt called to schedule f/u for tomorrow.  She is alsorequesting rx for current outbreak, if you don't mind to go ahead and send that in for her.  Previously rx'd valACYclovir (VALTREX) 500 MG tablet.    Please advise.     Pt # [phone]

## 2022-07-12 ENCOUNTER — OFFICE VISIT (OUTPATIENT)
Dept: OBSTETRICS AND GYNECOLOGY | Facility: CLINIC | Age: 47
End: 2022-07-12

## 2022-07-12 VITALS
HEIGHT: 67 IN | HEART RATE: 68 BPM | SYSTOLIC BLOOD PRESSURE: 141 MMHG | BODY MASS INDEX: 21.16 KG/M2 | DIASTOLIC BLOOD PRESSURE: 93 MMHG | WEIGHT: 134.8 LBS

## 2022-07-12 DIAGNOSIS — Z11.3 SCREENING EXAMINATION FOR STD (SEXUALLY TRANSMITTED DISEASE): ICD-10-CM

## 2022-07-12 DIAGNOSIS — Z01.419 WELL WOMAN EXAM WITH ROUTINE GYNECOLOGICAL EXAM: Primary | ICD-10-CM

## 2022-07-12 DIAGNOSIS — N89.8 VAGINAL IRRITATION: ICD-10-CM

## 2022-07-12 DIAGNOSIS — R10.2 PELVIC PRESSURE IN FEMALE: ICD-10-CM

## 2022-07-12 DIAGNOSIS — D25.9 UTERINE LEIOMYOMA, UNSPECIFIED LOCATION: ICD-10-CM

## 2022-07-12 LAB
BILIRUB BLD-MCNC: NEGATIVE MG/DL
CLARITY, POC: ABNORMAL
COLOR UR: YELLOW
GLUCOSE UR STRIP-MCNC: NEGATIVE MG/DL
KETONES UR QL: NEGATIVE
LEUKOCYTE EST, POC: NEGATIVE
NITRITE UR-MCNC: NEGATIVE MG/ML
PH UR: 6.5 [PH] (ref 5–8)
PROT UR STRIP-MCNC: NEGATIVE MG/DL
RBC # UR STRIP: ABNORMAL /UL
SP GR UR: 1.02 (ref 1–1.03)
UROBILINOGEN UR QL: NORMAL

## 2022-07-12 PROCEDURE — 99396 PREV VISIT EST AGE 40-64: CPT | Performed by: OBSTETRICS & GYNECOLOGY

## 2022-07-12 PROCEDURE — 81002 URINALYSIS NONAUTO W/O SCOPE: CPT | Performed by: OBSTETRICS & GYNECOLOGY

## 2022-07-12 RX ORDER — FAMOTIDINE 20 MG/1
20 TABLET, FILM COATED ORAL
COMMUNITY
Start: 2022-03-13

## 2022-07-12 NOTE — PROGRESS NOTES
Chief Complaint   Patient presents with   • Annual Exam     Pt presents today for annual exam. Last annual and pap smear- 2021, last mammogram- 10/22/2015. Has c/o vaginal irritation and fibroids.       Patient reports she cannot wear a mask due to breathing issues.   Juan C Gordon is a 47 y.o.  who presents for an annual examination and also presents for c/o vaginal irritation. Has a h/o HSV - had an irritation on the top part of the vulva one week ago.  Picked up medication yesterday. No drainage.  Got remarried 2 weeks ago; would like STI testing today as she has not had it since she was with her last partner.    Had CT in 2022 that showed a likely 2.5cm fibroid  Pap history:  Last pap: 2021 NIL, HPV negative  Prior abnormal paps: yes, had LEEP in 20's - reports no abnormal pap smears in last 10 years.    STDs  Sexually active: yes  History of STDs: no  Contraception:  Reports partner had a prostatectomy and cannot have kids  She reports periods are monthly, lasting 5 days.  On heaviest day, uses pads - changes about four times per day.  Does soak through at night; gets up at night to change.    Mammogram: not in last year.    Colonoscopy: saw Dr. Marc; did not get colonoscopy due to scheduling     Screening for BRCA-   Is patient's family history significant for BRCA risk factors? no    Past Medical History:   Diagnosis Date   • Herpes genitalia    • IBS (irritable bowel syndrome)    • Kidney stones    • Ovarian cyst      Past Surgical History:   Procedure Laterality Date   • DILATION AND CURETTAGE, DIAGNOSTIC / THERAPEUTIC     • LEEP     • OVARIAN CYST REMOVAL       OB History    Para Term  AB Living   4 4 3 1   4   SAB IAB Ectopic Molar Multiple Live Births                    # Outcome Date GA Lbr Shin/2nd Weight Sex Delivery Anes PTL Lv   4             3 Term            2 Term            1 Term               Social History     Tobacco Use   • Smoking status: Never  "Smoker   • Smokeless tobacco: Never Used   Substance Use Topics   • Alcohol use: No   • Drug use: No     Family History   Problem Relation Age of Onset   • Hypertension Father    • Brain cancer Father    • Hypertension Mother    • Breast cancer Neg Hx    • Ovarian cancer Neg Hx    • Uterine cancer Neg Hx      Current Outpatient Medications on File Prior to Visit   Medication Sig Dispense Refill   • famotidine (Pepcid) 20 MG tablet 20 mg.     • valACYclovir (Valtrex) 500 MG tablet BID x 3 days PRN outbreak 30 tablet 1     No current facility-administered medications on file prior to visit.     Allergies   Allergen Reactions   • Amoxicillin Swelling        Review of Systems      OBJECTIVE:   Vitals:    07/12/22 1024   BP: 141/93   Pulse: 68   Weight: 61.1 kg (134 lb 12.8 oz)   Height: 170.2 cm (67.01\")      Physical Exam  Exam conducted with a chaperone present.   Constitutional:       General: She is not in acute distress.     Appearance: She is well-developed. She is not diaphoretic.   HENT:      Head: Normocephalic and atraumatic.   Neck:      Thyroid: No thyromegaly.      Trachea: No tracheal deviation.   Cardiovascular:      Rate and Rhythm: Normal rate.      Heart sounds: Normal heart sounds. No murmur heard.    No friction rub. No gallop.   Pulmonary:      Effort: Pulmonary effort is normal. No respiratory distress.      Breath sounds: Normal breath sounds.   Chest:      Chest wall: No tenderness.   Breasts:      Right: No inverted nipple, mass, nipple discharge, skin change or tenderness.      Left: No inverted nipple, mass, nipple discharge, skin change or tenderness.       Abdominal:      General: There is no distension.      Palpations: Abdomen is soft. There is no mass.      Tenderness: There is no abdominal tenderness.   Genitourinary:     General: Normal vulva.      Labia:         Right: No rash, lesion or injury.         Left: No rash, lesion or injury.       Vagina: No vaginal discharge, tenderness or " bleeding.      Cervix: No cervical motion tenderness, discharge or friability.      Uterus: Not deviated, not enlarged, not fixed and not tender.       Adnexa:         Right: No mass, tenderness or fullness.          Left: No mass, tenderness or fullness.        Comments: Small, 0.5cm erythematous area on right side of clitoral kent without ulceration or fluctuance  Musculoskeletal:         General: No deformity. Normal range of motion.   Lymphadenopathy:      Cervical: No cervical adenopathy.   Skin:     General: Skin is warm and dry.      Findings: No rash.   Neurological:      Mental Status: She is alert and oriented to person, place, and time.   Psychiatric:         Behavior: Behavior normal.         Thought Content: Thought content normal.         Judgment: Judgment normal.         ASSESSMENT/PLAN:   (Z01.419) Well woman exam with routine gynecological exam    (N89.8) Vaginal irritation - Plan: POC Urinalysis Dipstick, NuSwab VG+ - Swab, Vagina, HIV-1 / O / 2 Ag / Antibody 4th Generation, Hepatitis C Antibody, RPR, NuSwab VG+ - Swab, Vagina    (Z11.3) Screening examination for STD (sexually transmitted disease) - Plan: NuSwab VG+ - Swab, Vagina, HIV-1 / O / 2 Ag / Antibody 4th Generation, Hepatitis C Antibody, RPR, NuSwab VG+ - Swab, Vagina    (R10.2) Pelvic pressure in female - Plan: US Non-ob Transvaginal  - reviewed fibroids can be common. Will get US to better localize. No bleeding issues, but sometimes feels pressure  (D25.9) Uterine leiomyoma, unspecified location - Plan: US Non-ob Transvaginal      Annual well woman exam:  Cervical cancer screening:    Remote h/o cervical dysplasia in past   The patient is due for a pap in 2024.    Screening guidelines discussed with patient  Breast cancer screening:    Clinical breast exam recommended for age 20-39 years every 1-3 years   Mammogram recommended starting age 40 - will schedule   Breast self awareness encouraged  STD Screening   Testing desires.. Started  valtrex. Encouraged follow up sooner if lesion gets larger.   Contraception :   See above    Family history    does not demonstrate need for genetics referral   Healthy lifestyle counseling:   return for routine annual checkups   - encouraged colonoscopy           Return for mammogram at [place] as available with GYN US.

## 2022-07-13 ENCOUNTER — TELEPHONE (OUTPATIENT)
Dept: OBSTETRICS AND GYNECOLOGY | Facility: CLINIC | Age: 47
End: 2022-07-13

## 2022-07-13 LAB
HCV AB S/CO SERPL IA: <0.1 S/CO RATIO (ref 0–0.9)
HIV 1+2 AB+HIV1 P24 AG SERPL QL IA: NON REACTIVE
RPR SER QL: NON REACTIVE

## 2022-07-13 NOTE — TELEPHONE ENCOUNTER
----- Message from Danni Grossman MD sent at 7/13/2022 12:31 PM EDT -----  Please call patient and let her know that her test results for HIV, Hepatitis C and syphilis (RPR) were normal.  If she has any questions, I am happy to answer them. Thanks!

## 2023-04-06 ENCOUNTER — OFFICE VISIT (OUTPATIENT)
Dept: OBSTETRICS AND GYNECOLOGY | Facility: CLINIC | Age: 48
End: 2023-04-06
Payer: MEDICAID

## 2023-04-06 VITALS
HEIGHT: 67 IN | BODY MASS INDEX: 20.4 KG/M2 | SYSTOLIC BLOOD PRESSURE: 131 MMHG | DIASTOLIC BLOOD PRESSURE: 85 MMHG | WEIGHT: 130 LBS

## 2023-04-06 DIAGNOSIS — D25.9 UTERINE LEIOMYOMA, UNSPECIFIED LOCATION: ICD-10-CM

## 2023-04-06 DIAGNOSIS — N83.202 CYST OF LEFT OVARY: Primary | ICD-10-CM

## 2023-04-06 PROCEDURE — 1160F RVW MEDS BY RX/DR IN RCRD: CPT | Performed by: NURSE PRACTITIONER

## 2023-04-06 PROCEDURE — 1159F MED LIST DOCD IN RCRD: CPT | Performed by: NURSE PRACTITIONER

## 2023-04-06 PROCEDURE — 99213 OFFICE O/P EST LOW 20 MIN: CPT | Performed by: NURSE PRACTITIONER

## 2023-04-06 NOTE — PROGRESS NOTES
"Chief Complaint   Patient presents with   • Follow-up     Discuss U/S      SUBJECTIVE:     Juan C Gordon is a 48 y.o.  who presents to f/u uterine fibroids found on CT scan with PCP. She has been having abdominal issues, c/o lower abdominal pain following meals. Pain today is LLQ. Denies fevers, chills, N&V, vaginal discharge.     Past Medical History:   Diagnosis Date   • Herpes genitalia    • IBS (irritable bowel syndrome)    • Kidney stones    • Ovarian cyst       Past Surgical History:   Procedure Laterality Date   • DILATION AND CURETTAGE, DIAGNOSTIC / THERAPEUTIC     • LEEP     • OVARIAN CYST REMOVAL        Review of Systems   Constitutional: Negative for chills, fatigue and fever.   Gastrointestinal: Positive for abdominal distention and abdominal pain. Negative for constipation, diarrhea, nausea and vomiting.   Genitourinary: Negative for dyspareunia, dysuria, menstrual problem, pelvic pain, vaginal bleeding, vaginal discharge and vaginal pain.       OBJECTIVE:   Vitals:    23 1552   BP: 131/85   Weight: 59 kg (130 lb)   Height: 170.2 cm (67.01\")        Physical Exam  Constitutional:       General: She is not in acute distress.     Appearance: Normal appearance. She is not ill-appearing, toxic-appearing or diaphoretic.   Cardiovascular:      Rate and Rhythm: Normal rate.   Pulmonary:      Effort: Pulmonary effort is normal.   Abdominal:      General: There is no distension.      Palpations: Abdomen is soft. There is no mass.      Tenderness: There is no abdominal tenderness. There is no guarding.      Hernia: No hernia is present.   Musculoskeletal:         General: Normal range of motion.      Cervical back: Normal range of motion.   Neurological:      General: No focal deficit present.      Mental Status: She is alert and oriented to person, place, and time.   Skin:     General: Skin is warm and dry.   Psychiatric:         Mood and Affect: Mood normal.         Behavior: Behavior normal.         " Thought Content: Thought content normal.         Judgment: Judgment normal.   Vitals and nursing note reviewed.         Assessment/Plan    Diagnoses and all orders for this visit:    1. Cyst of left ovary (Primary)  -     US Non-ob Transvaginal; Future    2. Uterine leiomyoma, unspecified location  -     US Non-ob Transvaginal; Future    Discussed findings of TUVS today: Left ovary with complex cyst measuring 2cm and fibroid uterus with largest fibroid measuring 3X2cm  Reviewed torsion precautions  Encouraged motrin/tylenol, low heating pad PRN pain  Will repeat imaging in 6 weeks  Low suspicion pain is from uterine fibroids, however I cannot rule this out completely     Return in about 7 weeks (around 5/22/2023) for Ultrasound, Follow up, With Dr Grossman f/u fibroids and left ovarian cyst.    I spent 24 minutes caring for Juan C on this date of service. This time includes time spent by me in the following activities: preparing for the visit, reviewing tests, obtaining and/or reviewing a separately obtained history, performing a medically appropriate examination and/or evaluation, counseling and educating the patient/family/caregiver, ordering medications, tests, or procedures, referring and communicating with other health care professionals and documenting information in the medical record    Mia Gill, APRN  4/6/2023  16:56 EDT

## 2023-09-13 ENCOUNTER — OFFICE VISIT (OUTPATIENT)
Dept: OBSTETRICS AND GYNECOLOGY | Facility: CLINIC | Age: 48
End: 2023-09-13
Payer: OTHER GOVERNMENT

## 2023-09-13 VITALS
SYSTOLIC BLOOD PRESSURE: 131 MMHG | WEIGHT: 131.8 LBS | BODY MASS INDEX: 20.69 KG/M2 | DIASTOLIC BLOOD PRESSURE: 87 MMHG | HEIGHT: 67 IN

## 2023-09-13 DIAGNOSIS — R10.2 PELVIC PAIN: ICD-10-CM

## 2023-09-13 DIAGNOSIS — D25.9 UTERINE LEIOMYOMA, UNSPECIFIED LOCATION: Primary | ICD-10-CM

## 2023-09-13 RX ORDER — ERGOCALCIFEROL 1.25 MG/1
CAPSULE ORAL
COMMUNITY

## 2023-09-13 NOTE — PROGRESS NOTES
SUBJECTIVE:   Chief Complaint   Patient presents with    Follow-up     F/U from US        Juan C Gordon is a 48 y.o.  who presents for concern for pelvic pain, uterine fibroids. She had an US in the spring that showed a 2cm ovarian cyst as well. Reports still having regular periods.  She thinks her mom was in her late 50's, close to 60 when she went through menopause. She is not interested in a hysterectomy    Past Medical History:   Diagnosis Date    Herpes genitalia     IBS (irritable bowel syndrome)     Kidney stones     Ovarian cyst      Past Surgical History:   Procedure Laterality Date    DILATION AND CURETTAGE, DIAGNOSTIC / THERAPEUTIC      LEEP      OVARIAN CYST REMOVAL       OB History    Para Term  AB Living   4 4 3 1   4   SAB IAB Ectopic Molar Multiple Live Births                    # Outcome Date GA Lbr Shin/2nd Weight Sex Delivery Anes PTL Lv   4             3 Term            2 Term            1 Term               Social History     Tobacco Use    Smoking status: Never    Smokeless tobacco: Never   Vaping Use    Vaping Use: Never used   Substance Use Topics    Alcohol use: No    Drug use: No     Family History   Problem Relation Age of Onset    Hypertension Father     Brain cancer Father     Hypertension Mother     Breast cancer Neg Hx     Ovarian cancer Neg Hx     Uterine cancer Neg Hx      Current Outpatient Medications on File Prior to Visit   Medication Sig Dispense Refill    vitamin D (ERGOCALCIFEROL) 1.25 MG (18663 UT) capsule capsule TAKE 1 CAPSULE BY MOUTH 1 TIME A MONTH      valACYclovir (Valtrex) 500 MG tablet BID x 3 days PRN outbreak (Patient not taking: Reported on 2023) 30 tablet 1    [DISCONTINUED] famotidine (PEPCID) 20 MG tablet 1 tablet.       No current facility-administered medications on file prior to visit.     Allergies   Allergen Reactions    Amoxicillin Swelling        Review of Systems      OBJECTIVE:   Vitals:    23 1329   BP: 131/87  "  Weight: 59.8 kg (131 lb 12.8 oz)   Height: 170.2 cm (67.01\")      Physical Exam  Constitutional:       General: She is not in acute distress.     Appearance: She is well-developed. She is not diaphoretic.   HENT:      Head: Normocephalic and atraumatic.   Eyes:      General: No scleral icterus.     Extraocular Movements: Extraocular movements intact.   Pulmonary:      Effort: Pulmonary effort is normal. No respiratory distress.   Skin:     General: Skin is warm and dry.   Neurological:      General: No focal deficit present.      Mental Status: She is alert and oriented to person, place, and time.   Psychiatric:         Mood and Affect: Mood normal.         Behavior: Behavior normal.         Thought Content: Thought content normal.         Judgment: Judgment normal.       ASSESSMENT/PLAN:     ICD-10-CM ICD-9-CM   1. Uterine leiomyoma, unspecified location  D25.9 218.9   2. Pelvic pain  R10.2 [id number]       Reviewed US findings and options for treatment. She reports taking an herbal compound for fibroids- unsure what is in it.  We reviewed that some herbals can have side effects so if she is feeling these, please stop the medication and call us.  She was counseled on use of hormones, MyFembree a well as surgical options such as Accessa and hysterectomy.  Plan to call with worsening symptoms, otherwise will follow up in approx 3 months at annual exam    No orders of the defined types were placed in this encounter.      Return if symptoms worsen or fail to improve, for Annual physical (Jan 2024).            "

## 2023-11-29 RX ORDER — VALACYCLOVIR HYDROCHLORIDE 500 MG/1
TABLET, FILM COATED ORAL
Qty: 30 TABLET | Refills: 1 | Status: SHIPPED | OUTPATIENT
Start: 2023-11-29

## 2024-09-23 ENCOUNTER — OFFICE VISIT (OUTPATIENT)
Dept: OBSTETRICS AND GYNECOLOGY | Facility: CLINIC | Age: 49
End: 2024-09-23
Payer: OTHER GOVERNMENT

## 2024-09-23 VITALS
DIASTOLIC BLOOD PRESSURE: 80 MMHG | WEIGHT: 128 LBS | BODY MASS INDEX: 20.09 KG/M2 | HEIGHT: 67 IN | SYSTOLIC BLOOD PRESSURE: 120 MMHG

## 2024-09-23 DIAGNOSIS — R10.2 VULVAR PAIN: Primary | ICD-10-CM

## 2024-09-23 RX ORDER — CEPHALEXIN 500 MG/1
500 CAPSULE ORAL 4 TIMES DAILY
Qty: 40 CAPSULE | Refills: 0 | Status: SHIPPED | OUTPATIENT
Start: 2024-09-23 | End: 2024-10-03

## 2024-09-23 RX ORDER — VALACYCLOVIR HYDROCHLORIDE 1 G/1
1000 TABLET, FILM COATED ORAL DAILY
Qty: 5 TABLET | Refills: 0 | Status: SHIPPED | OUTPATIENT
Start: 2024-09-23

## 2024-09-24 ENCOUNTER — TELEPHONE (OUTPATIENT)
Dept: OBSTETRICS AND GYNECOLOGY | Facility: CLINIC | Age: 49
End: 2024-09-24
Payer: OTHER GOVERNMENT

## 2024-09-24 RX ORDER — VALACYCLOVIR HYDROCHLORIDE 1 G/1
1000 TABLET, FILM COATED ORAL DAILY
Qty: 5 TABLET | Refills: 6 | Status: SHIPPED | OUTPATIENT
Start: 2024-09-24

## 2024-09-28 LAB
A VAGINAE DNA VAG QL NAA+PROBE: ABNORMAL SCORE
BACTERIA GENITAL AEROBE CULT: NORMAL
BACTERIA SPEC CULT: NORMAL
BVAB2 DNA VAG QL NAA+PROBE: ABNORMAL SCORE
C ALBICANS DNA VAG QL NAA+PROBE: NEGATIVE
C GLABRATA DNA VAG QL NAA+PROBE: NEGATIVE
C TRACH DNA SPEC QL NAA+PROBE: NEGATIVE
HSV1 DNA SPEC QL NAA+PROBE: NEGATIVE
HSV2 DNA SPEC QL NAA+PROBE: POSITIVE
MEGA1 DNA VAG QL NAA+PROBE: ABNORMAL SCORE
N GONORRHOEA DNA VAG QL NAA+PROBE: NEGATIVE
T VAGINALIS DNA VAG QL NAA+PROBE: NEGATIVE

## 2024-09-30 ENCOUNTER — TELEPHONE (OUTPATIENT)
Dept: OBSTETRICS AND GYNECOLOGY | Facility: CLINIC | Age: 49
End: 2024-09-30
Payer: OTHER GOVERNMENT

## 2024-09-30 DIAGNOSIS — K64.4 EXTERNAL HEMORRHOIDS: Primary | ICD-10-CM

## 2024-09-30 NOTE — TELEPHONE ENCOUNTER
[facility]   Pt informed of HSV results & medication sent to pharmacy.    Pt is requesting a referral for hemorrhoids.       Thanks,   Zina

## 2024-09-30 NOTE — TELEPHONE ENCOUNTER
Referral set for scheduling.  [facility] will call pt to schedule, or she may reach their office at [phone]